# Patient Record
Sex: FEMALE | Race: OTHER | HISPANIC OR LATINO | URBAN - METROPOLITAN AREA
[De-identification: names, ages, dates, MRNs, and addresses within clinical notes are randomized per-mention and may not be internally consistent; named-entity substitution may affect disease eponyms.]

---

## 2017-06-22 ENCOUNTER — EMERGENCY (EMERGENCY)
Facility: HOSPITAL | Age: 52
LOS: 1 days | Discharge: HOME | End: 2017-06-22
Admitting: EMERGENCY MEDICINE
Payer: COMMERCIAL

## 2017-06-22 VITALS
TEMPERATURE: 100 F | HEART RATE: 94 BPM | RESPIRATION RATE: 18 BRPM | SYSTOLIC BLOOD PRESSURE: 143 MMHG | DIASTOLIC BLOOD PRESSURE: 83 MMHG | OXYGEN SATURATION: 96 %

## 2017-06-22 DIAGNOSIS — M25.511 PAIN IN RIGHT SHOULDER: ICD-10-CM
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
## 2017-06-22 PROCEDURE — 96372 THER/PROPH/DIAG INJ SC/IM: CPT

## 2017-06-22 PROCEDURE — 73030 X-RAY EXAM OF SHOULDER: CPT | Mod: 26,RT

## 2017-06-22 PROCEDURE — 73030 X-RAY EXAM OF SHOULDER: CPT

## 2017-06-22 PROCEDURE — 99283 EMERGENCY DEPT VISIT LOW MDM: CPT | Mod: 25

## 2017-06-22 PROCEDURE — 99284 EMERGENCY DEPT VISIT MOD MDM: CPT

## 2017-06-22 RX ORDER — IBUPROFEN 200 MG
1 TABLET ORAL
Qty: 40 | Refills: 0 | OUTPATIENT
Start: 2017-06-22 | End: 2017-07-02

## 2017-06-22 RX ORDER — KETOROLAC TROMETHAMINE 30 MG/ML
30 SYRINGE (ML) INJECTION ONCE
Qty: 0 | Refills: 0 | Status: DISCONTINUED | OUTPATIENT
Start: 2017-06-22 | End: 2017-06-22

## 2017-06-22 RX ADMIN — Medication 30 MILLIGRAM(S): at 13:29

## 2017-06-22 NOTE — ED PROVIDER NOTE - PHYSICAL EXAMINATION
+ tenderness to anterior right shoulder with mild swelling. no warmth or erythema. limited ROM due to pain. no deformity. distal NVI. remainder of RUE non tender.

## 2017-06-22 NOTE — ED PROVIDER NOTE - OBJECTIVE STATEMENT
53 y/o female c/o right shoulder pain x 3 days. pt states sharp pain began 3 days ago after heavy lifting. dull achy pain worse with movement and pt reports unable to fully raise arm. no numbness or tingling. no trauma. no cp or sob. no further complaints.

## 2017-06-22 NOTE — ED PROVIDER NOTE - MEDICAL DECISION MAKING DETAILS
shoulder pain likely tendonitis. no evidence of infection on exam. neurovascular intact. pain meds given. x-ray no acute pathology. motrin, RICE and f/u with ortho

## 2017-06-22 NOTE — ED ADULT TRIAGE NOTE - CHIEF COMPLAINT QUOTE
right shoulder pain x3 days after lifting heavy object. Patient reports she is unable to lift the right arm over her head.

## 2017-06-22 NOTE — ED ADULT NURSE NOTE - OBJECTIVE STATEMENT
Pt presents to the ED with complaints of right shoulder pain x 2 days after "lifting something heavy at work". Reports 8/8 pain. No obvious deformities noted.